# Patient Record
(demographics unavailable — no encounter records)

---

## 2024-12-13 NOTE — ASSESSMENT
[FreeTextEntry1] : 10-year-old female here accompanied by her parents presents today with status post a right knee injury.  Patient reports a week ago she was doing gymnastics when she felt a pop in her right knee.  Reports pain and swelling since the time of injury.  She also reports an incident following the injury where her knee gave out on her.  Denies any numbness or tingling.  Reports an antalgic gait.  Physical examination of the right knee: Mild swelling appreciated anteriorly.  No ecchymosis or erythema is appreciated.  Skin is intact.  Patient able to straight leg raise off the flat examination table.  Can fully flex and extend but with mild pain.  Mild tenderness over the ACL and patellar tendon.  Negative Christophe's.  Negative Lachman's.  Calf is soft and nontender.  No instability.  Antalgic gait.  Sensation is intact distally.  Neuro vas intact.  X-rays of the right knee taken in the office today:  No acute fractures, subluxations, or dislocations. X-rays of the left knee taken in the office today for comparison reasons:  No acute fractures, subluxations, or dislocations.  Treatment plan as discussed: My clinical suspicion is high for a sprain and possible tear of the ACL of the right knee given the patient's history, physical examination findings, and x-ray findings. Script for MRI ordered. Encouraged patient to call the office following the MRI to discuss results.  I recommended anti-inflammatory medication. Patient agrees to taking Advil/Ibuprofen OTC as needed for pain. Benefits discussed. Confirmed no contraindication to NSAIDs.  I recommended patient rest, ice, compress, and elevate the knee regularly. Encouraged activity modification as tolerable. Encouraged gentle range of motion to avoid stiffness. No gym /sports at least until further evaluation.   All questions and concerns addressed to patient's satisfaction. Patient expresses full understanding of treatment plan. Patient will follow up in 2 to 3 weeks with Dr. Hope for repeat evaluation and treatment.

## 2025-01-09 NOTE — ASSESSMENT
[FreeTextEntry1] : no gym/sports for 3 weeks can do stretches for the knee and light physical activity when returning to sports need to stretch before and after exercising f/u PRN

## 2025-01-09 NOTE — PHYSICAL EXAM
[Not Examined] : not examined [Normal] : The patient is moving all extremities spontaneously without any gross neurologic deficits. They walk with a fluid nonantalgic gait. There are equal and symmetric deep tendon reflexes in the upper and lower extremities bilaterally. There is gross intact sensation to soft and light touch in the bilateral upper and lower extremities [de-identified] :  ISLT Intact Motor

## 2025-01-09 NOTE — PHYSICAL EXAM
[Not Examined] : not examined [Normal] : The patient is moving all extremities spontaneously without any gross neurologic deficits. They walk with a fluid nonantalgic gait. There are equal and symmetric deep tendon reflexes in the upper and lower extremities bilaterally. There is gross intact sensation to soft and light touch in the bilateral upper and lower extremities [de-identified] :  ISLT Intact Motor

## 2025-01-09 NOTE — HISTORY OF PRESENT ILLNESS
[FreeTextEntry1] : 10 y/o female with right knee pain after doing gymnastics about 3 weeks ago and feeling a pop in her right knee. The knee has also given out on her after the initial injury. Had MRI done which shows bone bruising of the inferior patella and partial tear of the patella tendon.